# Patient Record
Sex: MALE | Race: BLACK OR AFRICAN AMERICAN | URBAN - METROPOLITAN AREA
[De-identification: names, ages, dates, MRNs, and addresses within clinical notes are randomized per-mention and may not be internally consistent; named-entity substitution may affect disease eponyms.]

---

## 2023-07-07 ENCOUNTER — WEB ENCOUNTER (OUTPATIENT)
Dept: URBAN - METROPOLITAN AREA CLINIC 86 | Facility: CLINIC | Age: 73
End: 2023-07-07

## 2023-07-07 ENCOUNTER — OFFICE VISIT (OUTPATIENT)
Dept: URBAN - METROPOLITAN AREA CLINIC 86 | Facility: CLINIC | Age: 73
End: 2023-07-07
Payer: COMMERCIAL

## 2023-07-07 ENCOUNTER — LAB OUTSIDE AN ENCOUNTER (OUTPATIENT)
Dept: URBAN - METROPOLITAN AREA CLINIC 86 | Facility: CLINIC | Age: 73
End: 2023-07-07

## 2023-07-07 VITALS
TEMPERATURE: 97.1 F | DIASTOLIC BLOOD PRESSURE: 104 MMHG | WEIGHT: 173 LBS | SYSTOLIC BLOOD PRESSURE: 221 MMHG | HEART RATE: 65 BPM

## 2023-07-07 DIAGNOSIS — Z71.85 VACCINE COUNSELING: ICD-10-CM

## 2023-07-07 DIAGNOSIS — B18.2 CHRONIC HEPATITIS C WITHOUT HEPATIC COMA: ICD-10-CM

## 2023-07-07 DIAGNOSIS — Z79.899 HIGH RISK MEDICATION USE: ICD-10-CM

## 2023-07-07 DIAGNOSIS — I63.9 CEREBROVASCULAR ACCIDENT (CVA), UNSPECIFIED MECHANISM: ICD-10-CM

## 2023-07-07 DIAGNOSIS — Z78.9 ALCOHOL USE: ICD-10-CM

## 2023-07-07 PROBLEM — 128302006: Status: ACTIVE | Noted: 2023-07-07

## 2023-07-07 PROBLEM — 453861000124107: Status: ACTIVE | Noted: 2023-07-07

## 2023-07-07 PROBLEM — 443913008: Status: ACTIVE | Noted: 2023-07-07

## 2023-07-07 PROBLEM — 230690007: Status: ACTIVE | Noted: 2023-07-07

## 2023-07-07 PROBLEM — 219006: Status: ACTIVE | Noted: 2023-07-07

## 2023-07-07 PROCEDURE — 99204 OFFICE O/P NEW MOD 45 MIN: CPT | Performed by: PHYSICIAN ASSISTANT

## 2023-07-07 RX ORDER — ATORVASTATIN CALCIUM 40 MG/1
1 TABLET TABLET, FILM COATED ORAL ONCE A DAY
Status: ACTIVE | COMMUNITY

## 2023-07-07 RX ORDER — CLONIDINE HYDROCHLORIDE 0.1 MG/1
1 TABLET TABLET ORAL ONCE A DAY
Status: ACTIVE | COMMUNITY

## 2023-07-07 RX ORDER — AMLODIPINE AND VALSARTAN 10; 160 MG/1; MG/1
1 TABLET TABLET, FILM COATED ORAL ONCE A DAY
Status: ACTIVE | COMMUNITY

## 2023-07-07 RX ORDER — CLOPIDOGREL 75 MG/1
1 TABLET TABLET, FILM COATED ORAL ONCE A DAY
Status: ACTIVE | COMMUNITY

## 2023-07-07 RX ORDER — ASPIRIN 81 MG/1
1 TABLET TABLET, COATED ORAL ONCE A DAY
Status: ACTIVE | COMMUNITY

## 2023-07-07 RX ORDER — LOSARTAN POTASSIUM 100 MG/1
1 TABLET TABLET ORAL ONCE A DAY
Status: ACTIVE | COMMUNITY

## 2023-07-07 RX ORDER — BUDESONIDE AND FORMOTEROL FUMARATE DIHYDRATE 80; 4.5 UG/1; UG/1
2 PUFFS AEROSOL RESPIRATORY (INHALATION) ONCE A DAY
Status: ACTIVE | COMMUNITY

## 2023-07-07 RX ORDER — TERAZOSIN 5 MG/1
1 CAPSULE AT BEDTIME CAPSULE ORAL ONCE A DAY
Status: ACTIVE | COMMUNITY

## 2023-07-07 RX ORDER — HYDRALAZINE HYDROCHLORIDE 50 MG/1
1 TABLET WITH FOOD TABLET, FILM COATED ORAL THREE TIMES A DAY
Status: ACTIVE | COMMUNITY

## 2023-07-07 RX ORDER — METOPROLOL SUCCINATE 25 MG/1
1 TABLET TABLET, FILM COATED, EXTENDED RELEASE ORAL ONCE A DAY
Status: ACTIVE | COMMUNITY

## 2023-07-07 NOTE — HPI-TODAY'S VISIT:
This is a 72-year-old male who presents today for evaluation of hepatitis C.  He was referred by Dr. Chiara Cornell.  A copy of the note will be sent to the referring provider.  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  He is listed as taking albuterol, amlodipine valsartan 160 mg, aspirin 81 mg, hydralazine HCl 50 mg every 12 hours, metoprolol 25 mg, Plavix 75 mg, terazosin 5 mg, Symbicort.  Atorvastatin 40 mg, clonidine 0.1 mg  He was treated in 2018 with Harvoni and per the last labs in [facility].

## 2023-07-10 LAB
A/G RATIO: 1.8
ALBUMIN: 4.4
ALKALINE PHOSPHATASE: 77
ALT (SGPT): 17
AST (SGOT): 29
BASO (ABSOLUTE): 0.1
BASOS: 1
BILIRUBIN, TOTAL: 0.5
BUN/CREATININE RATIO: 13
BUN: 26
CALCIUM: 9.4
CARBON DIOXIDE, TOTAL: 22
CHLORIDE: 104
CREATININE: 2
EGFR: 35
EOS (ABSOLUTE): 0.4
EOS: 5
GLOBULIN, TOTAL: 2.4
GLUCOSE: 98
HBSAG SCREEN: NEGATIVE
HCV LOG10: (no result)
HEMATOCRIT: 38.8
HEMATOLOGY COMMENTS:: (no result)
HEMOGLOBIN: 12.1
HEP A AB, TOTAL: POSITIVE
HEP B CORE AB, TOT: POSITIVE
HEPATITIS B SURF AB QUANT: 35.8
HEPATITIS C QUANTITATION: (no result)
IMMATURE CELLS: (no result)
IMMATURE GRANS (ABS): 0
IMMATURE GRANULOCYTES: 0
INR: 1
LYMPHS (ABSOLUTE): 1.9
LYMPHS: 25
MCH: 26.1
MCHC: 31.2
MCV: 84
MONOCYTES(ABSOLUTE): 0.6
MONOCYTES: 8
NEUTROPHILS (ABSOLUTE): 4.8
NEUTROPHILS: 61
NRBC: (no result)
PLATELETS: 369
POTASSIUM: 4.5
PROTEIN, TOTAL: 6.8
PROTHROMBIN TIME: 10.2
RBC: 4.64
RDW: 14.3
SODIUM: 142
TEST INFORMATION:: (no result)
WBC: 7.8

## 2023-07-11 ENCOUNTER — TELEPHONE ENCOUNTER (OUTPATIENT)
Dept: URBAN - METROPOLITAN AREA CLINIC 86 | Facility: CLINIC | Age: 73
End: 2023-07-11

## 2023-07-11 NOTE — HPI-TODAY'S VISIT:
Dear Maxime Washington,  The July 7 labs were sent to me.  The hepatitis C was not detected.  You have immunity to hepatitis B.  Surface antibody level 35.8.  The complete metabolic panel shows that the glucose is 98, creatinine is 2.0 and this is elevated and would recommend sharing this with the primary care.  This is a measurement of the kidney function.  Sodium 142, potassium 4.5, bilirubin 0.5, alkaline phosphatase 77, AST 29, ALT 17.  These are normal.  The white blood cell 7.8, red blood cells 4.64 hemoglobin 12.1 and this is slightly low and would also recommend sharing this with the primary care so they are aware.  Platelets 369 and this is normal.  INR 1.0.  You have immunity to hepatitis A.  The hepatitis B core antibody total is positive.  The surface antigen is negative.  There is no active hepatitis B but at some point in your lifetime you were exposed to the virus and this is what the lab is showing.  Your immune system fought the virus off.  This is important to know if you ever needing immunosuppression.  We will see what the ultrasound shows.  Karen Escobar PA-C

## 2023-08-24 ENCOUNTER — OFFICE VISIT (OUTPATIENT)
Dept: URBAN - METROPOLITAN AREA CLINIC 86 | Facility: CLINIC | Age: 73
End: 2023-08-24

## 2023-08-24 ENCOUNTER — OFFICE VISIT (OUTPATIENT)
Dept: URBAN - METROPOLITAN AREA CLINIC 91 | Facility: CLINIC | Age: 73
End: 2023-08-24

## 2023-08-24 PROBLEM — 736687002: Status: ACTIVE | Noted: 2023-08-24

## 2023-08-24 RX ORDER — METOPROLOL SUCCINATE 25 MG/1
1 TABLET TABLET, FILM COATED, EXTENDED RELEASE ORAL ONCE A DAY
Status: ACTIVE | COMMUNITY

## 2023-08-24 RX ORDER — HYDRALAZINE HYDROCHLORIDE 50 MG/1
1 TABLET WITH FOOD TABLET, FILM COATED ORAL THREE TIMES A DAY
Status: ACTIVE | COMMUNITY

## 2023-08-24 RX ORDER — LOSARTAN POTASSIUM 100 MG/1
1 TABLET TABLET ORAL ONCE A DAY
Status: ACTIVE | COMMUNITY

## 2023-08-24 RX ORDER — ATORVASTATIN CALCIUM 40 MG/1
1 TABLET TABLET, FILM COATED ORAL ONCE A DAY
Status: ACTIVE | COMMUNITY

## 2023-08-24 RX ORDER — CLONIDINE HYDROCHLORIDE 0.1 MG/1
1 TABLET TABLET ORAL ONCE A DAY
Status: ACTIVE | COMMUNITY

## 2023-08-24 RX ORDER — BUDESONIDE AND FORMOTEROL FUMARATE DIHYDRATE 80; 4.5 UG/1; UG/1
2 PUFFS AEROSOL RESPIRATORY (INHALATION) ONCE A DAY
Status: ACTIVE | COMMUNITY

## 2023-08-24 RX ORDER — ASPIRIN 81 MG/1
1 TABLET TABLET, COATED ORAL ONCE A DAY
Status: ACTIVE | COMMUNITY

## 2023-08-24 RX ORDER — AMLODIPINE AND VALSARTAN 10; 160 MG/1; MG/1
1 TABLET TABLET, FILM COATED ORAL ONCE A DAY
Status: ACTIVE | COMMUNITY

## 2023-08-24 RX ORDER — TERAZOSIN 5 MG/1
1 CAPSULE AT BEDTIME CAPSULE ORAL ONCE A DAY
Status: ACTIVE | COMMUNITY

## 2023-08-24 RX ORDER — CLOPIDOGREL 75 MG/1
1 TABLET TABLET, FILM COATED ORAL ONCE A DAY
Status: ACTIVE | COMMUNITY

## 2023-08-24 NOTE — HPI-TODAY'S VISIT:
This is a 72-year-old male who presents today for evaluation of hepatitis C.  He was referred by Dr. Chiara Cornell.  A copy of the note will be sent to the referring provider.   8/24/23 ov  The July 7 labs were sent to me.  The hepatitis C was not detected.  You have immunity to hepatitis B.  Surface antibody level 35.8.  The complete metabolic panel shows that the glucose is 98, creatinine is 2.0 and this is elevated and would recommend sharing this with the primary care.  This is a measurement of the kidney function.  Sodium 142, potassium 4.5, bilirubin 0.5, alkaline phosphatase 77, AST 29, ALT 17.  These are normal.  The white blood cell 7.8, red blood cells 4.64 hemoglobin 12.1 and this is slightly low and would also recommend sharing this with the primary care so they are aware.  Platelets 369 and this is normal.  INR 1.0.  You have immunity to hepatitis A.  The hepatitis B core antibody total is positive.  The surface antigen is negative. .   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  He is listed as taking albuterol, amlodipine valsartan 160 mg, aspirin 81 mg, hydralazine HCl 50 mg every 12 hours, metoprolol 25 mg, Plavix 75 mg, terazosin 5 mg, Symbicort.  Atorvastatin 40 mg, clonidine 0.1 mg  He was treated in 2018 with Harvoni and per the last labs in [facility].

## 2024-03-19 NOTE — PHYSICAL EXAM LYMPHATIC:
Neck , no lymphadenopathy The patient contacted to reschedule appointment.  An appointment has been scheduled on Monday, Caroline 3, 2024 at 3PM ([facility]).  Patient confirmed.  A copy of the appointment have been mailed out.

## 2024-04-17 ENCOUNTER — OV EP (OUTPATIENT)
Dept: URBAN - METROPOLITAN AREA CLINIC 92 | Facility: CLINIC | Age: 74
End: 2024-04-17
Payer: MEDICARE

## 2024-04-17 VITALS
SYSTOLIC BLOOD PRESSURE: 180 MMHG | BODY MASS INDEX: 28.28 KG/M2 | HEART RATE: 69 BPM | HEIGHT: 66 IN | TEMPERATURE: 97.2 F | DIASTOLIC BLOOD PRESSURE: 91 MMHG | WEIGHT: 176 LBS

## 2024-04-17 DIAGNOSIS — B18.2 CHRONIC HEPATITIS C WITHOUT HEPATIC COMA: ICD-10-CM

## 2024-04-17 PROCEDURE — 99213 OFFICE O/P EST LOW 20 MIN: CPT

## 2024-04-17 RX ORDER — BUDESONIDE AND FORMOTEROL FUMARATE DIHYDRATE 80; 4.5 UG/1; UG/1
2 PUFFS AEROSOL RESPIRATORY (INHALATION) ONCE A DAY
Status: ACTIVE | COMMUNITY

## 2024-04-17 RX ORDER — CLOPIDOGREL 75 MG/1
1 TABLET TABLET, FILM COATED ORAL ONCE A DAY
Status: ACTIVE | COMMUNITY

## 2024-04-17 RX ORDER — CLONIDINE HYDROCHLORIDE 0.1 MG/1
1 TABLET TABLET ORAL ONCE A DAY
Status: ACTIVE | COMMUNITY

## 2024-04-17 RX ORDER — AMLODIPINE AND VALSARTAN 10; 160 MG/1; MG/1
1 TABLET TABLET, FILM COATED ORAL ONCE A DAY
Status: ACTIVE | COMMUNITY

## 2024-04-17 RX ORDER — ATORVASTATIN CALCIUM 40 MG/1
1 TABLET TABLET, FILM COATED ORAL ONCE A DAY
Status: ACTIVE | COMMUNITY

## 2024-04-17 RX ORDER — HYDRALAZINE HYDROCHLORIDE 50 MG/1
1 TABLET WITH FOOD TABLET, FILM COATED ORAL THREE TIMES A DAY
Status: ACTIVE | COMMUNITY

## 2024-04-17 RX ORDER — ASPIRIN 81 MG/1
1 TABLET TABLET, COATED ORAL ONCE A DAY
Status: ACTIVE | COMMUNITY

## 2024-04-17 RX ORDER — METOPROLOL SUCCINATE 25 MG/1
1 TABLET TABLET, FILM COATED, EXTENDED RELEASE ORAL ONCE A DAY
Status: ACTIVE | COMMUNITY

## 2024-04-17 RX ORDER — LOSARTAN POTASSIUM 100 MG/1
1 TABLET TABLET ORAL ONCE A DAY
Status: ACTIVE | COMMUNITY

## 2024-04-17 RX ORDER — TERAZOSIN 5 MG/1
1 CAPSULE AT BEDTIME CAPSULE ORAL ONCE A DAY
Status: ACTIVE | COMMUNITY

## 2024-04-17 NOTE — PHYSICAL EXAM GASTROINTESTINAL
Abdomen,  soft, nontender, nondistended,  no guarding or rigidity,  no masses palpable,  normal bowel sounds Liver and Spleen,no hepatosplenomegaly , Abdomen,  soft, nontender, nondistended,  no guarding or rigidity,  no masses palpable,  normal bowel sounds Liver and Spleen,no hepatosplenomegaly

## 2024-04-17 NOTE — PHYSICAL EXAM HENT:
Head,  normocephalic,  atraumatic,  Face,  Face within normal limits , Head,  normocephalic,  atraumatic,  Face,  Face within normal limits

## 2024-04-17 NOTE — HPI-TODAY'S VISIT:
73-year-old male presents today for colonoscopy screening.  He sees liver center for chronic hepatitis C that was treated in 2018 with Harvoni x 3 months. Has not f/u with them since 7/2023.  Last colonoscopy: never  No family history of colon polyps or colon cancer.  Has a BM daily. Patient denies nausea, heartburn, dysphagia, abdominal pain, rectal bleeding, melena, unintentional weight loss, changes in bowel habits and loss of appetite.  He is on Plavix for an unknown reason. I did see patients rx bottle today and this is prescribed by his PCP Dr. Donny Durham. Patient denies pacemaker/defibrillator, diabetes, kidney disease, and home O2.

## 2024-04-17 NOTE — PHYSICAL EXAM CONSTITUTIONAL:
normal,  alert,  in no acute distress,  well developed, well nourished,  ambulating without difficulty,  normal communication ability , normal,  alert,  in no acute distress,  well developed, well nourished,  ambulating without difficulty,  normal communication ability